# Patient Record
Sex: FEMALE | Race: OTHER | Employment: UNEMPLOYED | URBAN - METROPOLITAN AREA
[De-identification: names, ages, dates, MRNs, and addresses within clinical notes are randomized per-mention and may not be internally consistent; named-entity substitution may affect disease eponyms.]

---

## 2023-02-02 ENCOUNTER — HOSPITAL ENCOUNTER (OUTPATIENT)
Age: 33
Discharge: HOME | End: 2023-02-02
Attending: OBSTETRICS & GYNECOLOGY | Admitting: OBSTETRICS & GYNECOLOGY

## 2023-02-02 VITALS
RESPIRATION RATE: 16 BRPM | TEMPERATURE: 98.9 F | HEART RATE: 78 BPM | WEIGHT: 152 LBS | BODY MASS INDEX: 29.84 KG/M2 | DIASTOLIC BLOOD PRESSURE: 61 MMHG | HEIGHT: 60 IN | SYSTOLIC BLOOD PRESSURE: 104 MMHG

## 2023-02-02 PROBLEM — Z36.89 ENCOUNTER FOR TRIAGE IN PREGNANT PATIENT: Status: ACTIVE | Noted: 2023-02-02

## 2023-02-02 LAB
BACTERIA: NEGATIVE /HPF
BILIRUBIN URINE: NEGATIVE MG/DL
BLOOD, URINE: NEGATIVE
CLARITY: CLEAR
COLOR: YELLOW
GLUCOSE, URINE: NEGATIVE MG/DL
KETONES, URINE: NEGATIVE MG/DL
LEUKOCYTE ESTERASE, URINE: ABNORMAL
MUCUS: ABNORMAL HPF
NITRITE URINE, QUANTITATIVE: NEGATIVE
PH, URINE: 6 (ref 5–8)
PROTEIN UA: NEGATIVE MG/DL
RBC URINE: <1 /HPF (ref 0–6)
SPECIFIC GRAVITY UA: 1.02 (ref 1–1.03)
SQUAMOUS EPITHELIAL: 4 /HPF
TRICHOMONAS: ABNORMAL /HPF
UNCLASSIFIED CAST: 6 /LPF
UROBILINOGEN, URINE: 0.2 MG/DL (ref 0.2–1)
WBC UA: 1 /HPF (ref 0–5)

## 2023-02-02 PROCEDURE — 6370000000 HC RX 637 (ALT 250 FOR IP): Performed by: OBSTETRICS & GYNECOLOGY

## 2023-02-02 PROCEDURE — 81001 URINALYSIS AUTO W/SCOPE: CPT

## 2023-02-02 PROCEDURE — 99211 OFF/OP EST MAY X REQ PHY/QHP: CPT

## 2023-02-02 RX ORDER — ONDANSETRON 4 MG/1
4 TABLET, FILM COATED ORAL
COMMUNITY

## 2023-02-02 RX ORDER — PROMETHAZINE HYDROCHLORIDE 12.5 MG/1
12.5 TABLET ORAL ONCE
Status: COMPLETED | OUTPATIENT
Start: 2023-02-02 | End: 2023-02-02

## 2023-02-02 RX ADMIN — PROMETHAZINE HYDROCHLORIDE 12.5 MG: 12.5 TABLET ORAL at 04:27

## 2023-02-02 NOTE — DISCHARGE INSTRUCTIONS
Date:_[id number]____________  Time:_0450_____   Discharged:  W/c:__   Ambulatory:_X____     Labor  Call your doctor if you have these signs:  ___ Regular tightening of the uterus coming as often as once every 15 minutes. ___ Menstrual-like cramps, they may be regular, or may be continuous and move to you back.  ___ A low, dull backache different from what you normally experience. It may come and go.  ___ Vaginal leaking of fluid. ___ Any bleeding from you vagina.  ___ Pain, burning or bleeding when you urinate. Always call your doctor if you:  ___ Notice decreased movement of your baby, different from what you are used to.  ___ Have heavy, bright red bleeding, like the heavy days of your period. ___ Have vaginal leaking of watery fluid. Keep your next appointment as scheduled. Activity:  _X__ Regular ___ Bedrest w/bathroom privileges  ___ Bedrest     ___ Pelvic Rest, nothing in vagina, no tampons, no intercourse. Diet:   ___ Regular  ___ Clear Liquids    __X_ Force fluids      ___ other      [facility]  Learn and Go with Nena Talbot  The tool you need if you're a [id number]! Anytime, anywhere parent education powered by Nena Talbot. Nena Talbot gives you access to evidence-based information so you know what to expect at every stage:  Prenatal  Labor and Birth  Postpartum Care  Breastfeeding and Formula feeding  [place] [facility]     Tools to help you keep track of:  Kick Counts  Contraction Timer  Feedings  Diaper Log  Measurements  Appointments    Scan the QR code to register for the [facility] mandi! After registering, check your email for credentials. Visit [employer] or download the FREE mandi from [employer] or [employer]. Manejo de las náuseas matutinas (del [facility]):  Instrucciones de cuidado  Managing Morning Sickness: Care Instructions  Generalidades     Las náuseas matutinas pueden ser la parte más difícil al comienzo del embarazo. Algunas personas sienten malestar estomacal leve y otras corren al baño todo el [place]. Qiana por laurie son por un tiempo solamente. Las náuseas matutinas suelen mejorar en el corby trimestre. Es probable que nan hormonas epi las responsables de las náuseas matutinas. Qiana usted [employer] cosas para sentirse mejor, nadege cambiar lo que come, evitar ciertos alimentos y olores y preguntarle a chilel médico sobre medicamentos que puede probar. La atención de seguimiento es dalia parte clave de chilel tratamiento y seguridad. Asegúrese de hacer y acudir a todas las citas, y llame a chilel médico si está teniendo problemas. También es dalia buena idea saber los resultados de nan exámenes y mantener dalia lista de los medicamentos que audra. ¿Cómo puede cuidarse en el hogar? Tenga comida en el estómago, qiana no demasiada a la vez. Las náuseas podrían empeorar si tiene el estómago vacío. Consuma joe o seis comidas pequeñas al día, en lugar de jazmin comidas abundantes. Para las náuseas matutinas, coma un refrigerio pequeño, nadege un par de [employer] o secas, antes de levantarse. Permita que chilel estómago se asiente shavonne unos minutos antes de salir despacio de la cama. Esther mucho líquido. Si tiene dalia enfermedad renal, cardíaca o hepática y tiene que restringir los líquidos, hable con chilel médico antes de aumentar la cantidad de líquido que joao. Algunas mujeres notan que el té de menta ayuda con las náuseas. Coma más proteína, nadege zi, pescado, carne magra, frijoles (habichuelas), nueces y semillas. Consuma alimentos con carbohidratos, nadege allan, cereales integrales, arroz y pasta. Evite los olores y [employer] den náuseas. Los alimentos condimentados o grasosos, los jugos cítricos, la [place], [place] café y el té con Francena Pat a menudo las náuseas. No esther alcohol. No fume. Trate de no estar [employer].  Si necesita ayuda para dejar de fumar, hable con chilel médico sobre programas y medicamentos para dejar de fumar. Estos pueden aumentar nan probabilidades de dejar el hábito para siempre. Si está tomando suplementos de blaise, pregúntele a samuels médico si son necesarios. El blaise puede [employer]. Descanse mucho. El estrés y [address] náuseas matutinas. Pregúntele a samuels médico si puede flash medicamentos recetas o productos de venta stehpane, nadege vitamina B6, doxilamina o jengibre, para [employer]. Samuels médico puede decirle cuál es la dosis reza para usted. [place] nan vitaminas prenatales en la noche con el estómago lleno. ¿Cuándo debe pedir ayuda? Llame a samuels médico ahora mismo o busque atención médica inmediata si:    Siente demasiado [employer] para beber líquidos. Tiene síntomas de deshidratación, tales nadege:  Ojos secos y boca seca. [place] orina de color oscuro. Más sed de la habitual.     Tiene nuevos síntomas, nadege diarrea, fiebre o dolor abdominal.   Preste especial atención a los cambios en samuels oscar y asegúrese de comunicarse con samuels médico si:    Pramod Farah. Tiene náuseas y vómito continuos. ¿Dónde puede encontrar más información? Mariana Smith a https://zi.healthwise.net/patientedes e escriba Z012 para más información sobre \"Manejo de las náuseas matutinas (del [facility]): Instrucciones de cuidado. \"  Revisado: 23 febrero, 2022               Versión del contenido: 13.5  © 1663-5472 Healthwise, Incorporated. Las instrucciones de cuidado fueron adaptadas bajo licencia por [facility] ([facility]). Si usted tiene [place] afección médica o sobre estas instrucciones, siempre pregunte a samuels profesional de oscar. [employer] niega toda garantía o responsabilidad por samuels uso de esta información.

## 2023-02-02 NOTE — FLOWSHEET NOTE
RN telephoned Dr. Farhan Pelayo with pt status. Pt presents with nausea. Pt said she had not vomited for a few hours just nauseous. Pt took Zofran at 2200 and was just unable to sleep so came in to be evaluated. Pt denied any vaginal bleeding or leaking of fluid. Dr. Farhan Pelayo ordered Phenergan 12.5mg PO once and then for pt to follow up in office with physician for further POC. RN given orders to discharge after urine results return if unremarkable. RN verified POC and orders.

## 2023-02-02 NOTE — FLOWSHEET NOTE
Pt said she feels better after phenergan. Discharge instructions reviewed with patient. Patient voiced understanding. Pt encouraged to call office for follow up. Pt denied needing a wheelchair for discharge. Pt ambulated off of unit with spouse with no signs of distress.

## 2023-02-02 NOTE — PROGRESS NOTES
Patient presents to [facility] with c/o nausea and dehydration. Oriented to LT-03 and call light system.  [id number] used. Instructed to change into gown and in need of CCUA. Patient voiced understanding.